# Patient Record
Sex: MALE | Employment: FULL TIME | URBAN - METROPOLITAN AREA
[De-identification: names, ages, dates, MRNs, and addresses within clinical notes are randomized per-mention and may not be internally consistent; named-entity substitution may affect disease eponyms.]

---

## 2018-08-06 ENCOUNTER — DOCUMENTATION ONLY (OUTPATIENT)
Dept: FAMILY MEDICINE | Facility: CLINIC | Age: 43
End: 2018-08-06

## 2018-08-06 ENCOUNTER — OFFICE VISIT (OUTPATIENT)
Dept: FAMILY MEDICINE | Facility: CLINIC | Age: 43
End: 2018-08-06
Payer: COMMERCIAL

## 2018-08-06 VITALS
RESPIRATION RATE: 14 BRPM | HEART RATE: 61 BPM | DIASTOLIC BLOOD PRESSURE: 78 MMHG | OXYGEN SATURATION: 97 % | HEIGHT: 67 IN | WEIGHT: 160 LBS | SYSTOLIC BLOOD PRESSURE: 116 MMHG | BODY MASS INDEX: 25.11 KG/M2 | TEMPERATURE: 97.8 F

## 2018-08-06 DIAGNOSIS — Z02.89 HISTORY AND PHYSICAL EXAMINATION, IMMIGRATION: Primary | ICD-10-CM

## 2018-08-06 PROCEDURE — 36415 COLL VENOUS BLD VENIPUNCTURE: CPT | Performed by: FAMILY MEDICINE

## 2018-08-06 PROCEDURE — 86480 TB TEST CELL IMMUN MEASURE: CPT | Performed by: FAMILY MEDICINE

## 2018-08-06 PROCEDURE — 86706 HEP B SURFACE ANTIBODY: CPT | Performed by: FAMILY MEDICINE

## 2018-08-06 PROCEDURE — 86735 MUMPS ANTIBODY: CPT | Performed by: FAMILY MEDICINE

## 2018-08-06 PROCEDURE — 86592 SYPHILIS TEST NON-TREP QUAL: CPT | Performed by: FAMILY MEDICINE

## 2018-08-06 PROCEDURE — 99385 PREV VISIT NEW AGE 18-39: CPT | Performed by: FAMILY MEDICINE

## 2018-08-06 PROCEDURE — 86762 RUBELLA ANTIBODY: CPT | Performed by: FAMILY MEDICINE

## 2018-08-06 PROCEDURE — 86708 HEPATITIS A ANTIBODY: CPT | Performed by: FAMILY MEDICINE

## 2018-08-06 PROCEDURE — 86787 VARICELLA-ZOSTER ANTIBODY: CPT | Performed by: FAMILY MEDICINE

## 2018-08-06 PROCEDURE — 99000 SPECIMEN HANDLING OFFICE-LAB: CPT | Performed by: FAMILY MEDICINE

## 2018-08-06 PROCEDURE — 86780 TREPONEMA PALLIDUM: CPT | Mod: 59 | Performed by: FAMILY MEDICINE

## 2018-08-06 PROCEDURE — 86780 TREPONEMA PALLIDUM: CPT | Mod: 90 | Performed by: FAMILY MEDICINE

## 2018-08-06 PROCEDURE — 80307 DRUG TEST PRSMV CHEM ANLYZR: CPT | Mod: 90 | Performed by: FAMILY MEDICINE

## 2018-08-06 PROCEDURE — 87591 N.GONORRHOEAE DNA AMP PROB: CPT | Performed by: FAMILY MEDICINE

## 2018-08-06 ASSESSMENT — PAIN SCALES - GENERAL: PAINLEVEL: NO PAIN (0)

## 2018-08-06 NOTE — MR AVS SNAPSHOT
"              After Visit Summary   8/6/2018    Ramy Conti    MRN: [id number]           Patient Information     Date Of Birth          1975        Visit Information        Provider Department      8/6/2018 3:40 PM Raymond Hayes MD [facility]        Today's Diagnoses     History and physical examination, immigration    -  1       Follow-ups after your visit        Follow-up notes from your care team     Return if symptoms worsen or fail to improve.      Your next 10 appointments already scheduled     Aug 17, 2018  2:00 PM CDT   Nurse Only with PH FLOAT MA   [facility] ([facility])    [address] [place] [id number] [phone]            Aug 24, 2018  2:00 PM CDT   Nurse Only with PH FLOAT MA   [facility] ([address] [phone]              Who to contact     If you have questions or need follow up information about today's clinic visit or your schedule please contact [facility] directly at [phone].  Normal or non-critical lab and imaging results will be communicated to you by Ribbonhart, letter or phone within 4 business days after the clinic has received the results. If you do not hear from us within 7 days, please contact the clinic through TAPQUADt or phone. If you have a critical or abnormal lab result, we will notify you by phone as soon as possible.  Submit refill requests through [employer] or call your pharmacy and they will forward the refill request to us. Please allow 3 business days for your refill to be completed.          Additional Information About Your Visit        MyChart Information     [employer] lets you send messages to your doctor, view your test results, renew your prescriptions, schedule appointments and more. To sign up, go to [email or web address] . Click on \"Log in\" on the left side of the screen, which will take you to " "the Welcome page. Then click on \"Sign up Now\" on the right side of the page.     You will be asked to enter the access code listed below, as well as some personal information. Please follow the directions to create your username and password.     Your access code is: [email or web address]  Expires: 2018  9:00 PM     Your access code will  in 90 days. If you need help or a new code, please call your [place] clinic or [phone].        Care EveryWhere ID     This is your Care EveryWhere ID. This could be used by other organizations to access your [place] medical records  [id number]        Your Vitals Were     Pulse Temperature Respirations Height Pulse Oximetry BMI (Body Mass Index)    61 97.8  F (36.6  C) (Temporal) 14 5' 7.4\" (1.712 m) 97% 24.76 kg/m2       Blood Pressure from Last 3 Encounters:   08/10/18 104/68   18 116/78   11 110/70    Weight from Last 3 Encounters:   08/10/18 158 lb 9.6 oz (71.9 kg)   18 160 lb (72.6 kg)   11 160 lb (72.6 kg)              We Performed the Following     C IMMIGRATION PHYSICAL     Hepatitis A Antibody IgG     Hepatitis B Surface Antibody     Mumps Antibody IgG     Neisseria gonorrhoeae PCR     Pain Drug Scr UR W Rptd Meds     Quantiferon TB Gold Plus     Rapid Plasma Reagin w Rflx to TITER     Rubella Antibody IgG Quantitative     Treponema Abs w Reflex to RPR and Titer     Treponema pallidum antibody confirm     Varicella Zoster Virus Antibody IgG        Primary Care Provider Fax #    Physician No Ref-Primary [phone]       No address on file        Equal Access to Services     EUSE[employer] GAMBINO : Konstantin Gibbs, rocío shabazz, melchor burroughs. So [facility] [phone].    ATENCIÓN: Si habla español, tiene a motley disposición servicios gratuitos de asistencia lingüística. Llame al [phone].    We comply with applicable federal civil rights laws and [place] laws. We do not " discriminate on the basis of race, color, national origin, age, disability, sex, sexual orientation, or gender identity.            Thank you!     Thank you for choosing [facility]  for your care. Our goal is always to provide you with excellent care. Hearing back from our patients is one way we can continue to improve our services. Please take a few minutes to complete the written survey that you may receive in the mail after your visit with us. Thank you!             Your Updated Medication List - Protect others around you: Learn how to safely use, store and throw away your medicines at www.disposemymeds.org.      Notice  As of 8/6/2018 11:59 PM    You have not been prescribed any medications.

## 2018-08-06 NOTE — PROGRESS NOTES
Ramy is here for INS physical exam.  He moved from [facility] in 1994. 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  Stated that overall he is doing well and has no concern.  Denies of headache or dizziness.  No URI symptoms and denies of CP or SOB.  No fever or chill. No N/V/D/C.  No history of any of STI and denies any risk for it.  No penile discharge or having rash in the groin area. No of depression and has no history of depression, anxiety or mood disorder. No history or current homicidal or suicidal ideation.  Denies of hallucination and has no history of psychiatric hospitalization.  No pain and has no problem with sleeping.  Eating and drinking ok.  No problem with urination.  Denies of coughing or nigth sweat. No weight loss.  No exposure to TB.  No history of TB.  Has not positive PPD test.  Denies of using any kind of drug.      Problem list and histories reviewed & adjusted, as indicated.  Additional history: as documented      .PAST MEDICAL HISTORY:   Past Medical History:   Diagnosis Date     NO ACTIVE PROBLEMS        PAST SURGICAL HISTORY:   Past Surgical History:   Procedure Laterality Date     NO HISTORY OF SURGERY         FAMILY HISTORY:   Family History   Problem Relation Age of Onset     No Known Problems Mother      No Known Problems Father      Unknown/Adopted Maternal Grandmother      Unknown/Adopted Maternal Grandfather      Unknown/Adopted Paternal Grandmother      Unknown/Adopted Paternal Grandfather      No Known Problems Brother      No Known Problems Sister      No Known Problems Brother        SOCIAL HISTORY:   Social History   Substance Use Topics     Smoking status: Former Smoker     Smokeless tobacco: Former User     Alcohol use No      Comment: quit 6 years ago        No Known Allergies    No current outpatient prescriptions on file.         ROS:  Constitutional, HEENT, cardiovascular, pulmonary, gi and gu systems are negative, except as otherwise  "noted.      OBJECTIVE:                                                      Vitals: /78 (BP Location: Right arm, Patient Position: Sitting, Cuff Size: Adult Regular)  Pulse 61  Temp 97.8  F (36.6  C) (Temporal)  Resp 14  Ht 5' 7.4\" (1.712 m)  Wt 160 lb (72.6 kg)  SpO2 97%  BMI 24.76 kg/m2  BMI= Body mass index is 24.76 kg/(m^2).   GENERAL: healthy, alert and no distress  EYES: Eyes grossly normal to inspection, PERRL and conjunctivae and sclerae normal  HENT: ear canals and TM's normal, nose and mouth without ulcers or lesions.  Nares are non-congested. Oropharynx is pink and moist. No tender with palpation to the sinuses.  NECK: no adenopathy, no asymmetry or masses and thyroid normal to palpation.  RESP: lungs clear to auscultation - no rales, rhonchi or wheezes  CV: regular rate and rhythm, no murmur, no peripheral edema and peripheral pulses strong  ABDOMEN: soft, nontender, no hepatosplenomegaly or masses and bowel sounds normal   (male): normal male genitalia without lesions or urethral discharge, no hernia  MS: no gross musculoskeletal defects noted, no edema. Walk with no limping, normal gait. All 4 extremities are equally in strength. Ankle, knees, hips, shoulders, elbows and wrists exams normal. Normal fine motor skills on fingers. Back is straight, no lordosis or scoliosis. No tender with palpation.  SKIN: no suspicious lesions or rashes  NEURO: Normal strength and tone, mentation intact and speech normal.  No focal deficit.  PSYCH: mentation appears normal, affect normal/bright. No hallucination, suicidal or homicidal ideation.  Appropriate insight.    Diagnostic Test Results:   No results found for this or any previous visit (from the past 24 hour(s)).       ASSESSMENT/PLAN:                                                      1. Health examination of defined subpopulation  I personal reviewed the report of Medical Examination and Vaccination Record from [facility].  He has no chronic medical condition. Has not had positive PPD test and he displayed no symptoms of active TB.  No exposure to TB.  Recheck the QuantiFERON level. Denies any risk for STD, but will check for RPR (syphyllis) and gonorrhea. Exam showed no genital rash. He has no history of depression, anxiety, mood disorder and has never been diagnosed or hospitalized for any psychiatric disorders.  He displays no physical or mental disorders with associated harmful behavior. He denied of using drug - but will send out a urine drug screen. Reviewed his immunization record, lab ordered as below. Will fill out his paper on his behalf once the results are available.   Instructed him to not open the sealed envelope. All of his questions were answered and encourage to call in if has any concern.    Also inform him that he should follow up with his primary provider for his routine and chronic medical care. I did not address any chronic medical problem today.  He understands.      ICD-10-CM    1. History and physical examination, immigration Z02.89 C IMMIGRATION PHYSICAL     Hepatitis A Antibody IgG     Hepatitis B Surface Antibody     Mumps Antibody IgG     Rubella Antibody IgG Quantitative     Varicella Zoster Virus Antibody IgG     Treponema Abs w Reflex to RPR and Titer     Quantiferon TB Gold Plus     Pain Drug Scr UR W Rptd Meds     Neisseria gonorrhoeae PCR     CANCELED: Pain Drug Scr UR W Rptd Meds     CANCELED: M Tuberculosis by Quantiferon     CANCELED: Neisseria gonorrhoeae PCR         F/U as needed.    Raymond Morton Mai, MD  [facility]

## 2018-08-07 LAB
MUV IGG SER QL IA: 0.8 AI (ref 0–0.8)
RPR SER QL: NONREACTIVE
RUBV IGG SERPL IA-ACNC: 26 IU/ML
T PALLIDUM AB SER QL: REACTIVE
VZV IGG SER QL IA: 1.5 AI (ref 0–0.8)

## 2018-08-07 NOTE — PROGRESS NOTES
Patient did not leave a urine sample, orders were put back in future for one week. Thanks, Arely puri

## 2018-08-07 NOTE — PROGRESS NOTES
Informed pt that he needs to get back to get these labs for INS - Gonorrhea and drug screen. thanks

## 2018-08-08 LAB
GAMMA INTERFERON BACKGROUND BLD IA-ACNC: 0.07 IU/ML
HAV IGG SER QL IA: REACTIVE
HBV SURFACE AB SERPL IA-ACNC: 0 M[IU]/ML
M TB IFN-G BLD-IMP: NEGATIVE
M TB IFN-G CD4+ BCKGRND COR BLD-ACNC: >10 IU/ML
MITOGEN IGNF BCKGRD COR BLD-ACNC: 0 IU/ML
MITOGEN IGNF BCKGRD COR BLD-ACNC: 0.01 IU/ML
N GONORRHOEA DNA SPEC QL NAA+PROBE: NEGATIVE
SPECIMEN SOURCE: NORMAL

## 2018-08-09 ENCOUNTER — TELEPHONE (OUTPATIENT)
Dept: FAMILY MEDICINE | Facility: CLINIC | Age: 43
End: 2018-08-09

## 2018-08-09 LAB — T PALLIDUM AB SER QL AGGL: REACTIVE

## 2018-08-09 NOTE — TELEPHONE ENCOUNTER
----- Message from Raymond Morton Mai, MD sent at 8/9/2018  5:19 PM CDT -----  Please have patient to follow up for lab result on Friday or early next week

## 2018-08-10 ENCOUNTER — OFFICE VISIT (OUTPATIENT)
Dept: FAMILY MEDICINE | Facility: CLINIC | Age: 43
End: 2018-08-10
Payer: COMMERCIAL

## 2018-08-10 VITALS
HEART RATE: 80 BPM | SYSTOLIC BLOOD PRESSURE: 104 MMHG | RESPIRATION RATE: 16 BRPM | OXYGEN SATURATION: 98 % | TEMPERATURE: 98.3 F | BODY MASS INDEX: 24.55 KG/M2 | WEIGHT: 158.6 LBS | DIASTOLIC BLOOD PRESSURE: 68 MMHG

## 2018-08-10 DIAGNOSIS — A53.0 SYPHILI, LATENT: Primary | ICD-10-CM

## 2018-08-10 LAB — PAIN DRUG SCR UR W RPTD MEDS: NORMAL

## 2018-08-10 PROCEDURE — 99213 OFFICE O/P EST LOW 20 MIN: CPT | Mod: 25 | Performed by: FAMILY MEDICINE

## 2018-08-10 PROCEDURE — 36415 COLL VENOUS BLD VENIPUNCTURE: CPT | Performed by: FAMILY MEDICINE

## 2018-08-10 PROCEDURE — 96372 THER/PROPH/DIAG INJ SC/IM: CPT | Performed by: FAMILY MEDICINE

## 2018-08-10 PROCEDURE — 87389 HIV-1 AG W/HIV-1&-2 AB AG IA: CPT | Performed by: FAMILY MEDICINE

## 2018-08-10 ASSESSMENT — PAIN SCALES - GENERAL: PAINLEVEL: NO PAIN (0)

## 2018-08-10 NOTE — NURSING NOTE
Prior to injection verified patient identity using patient's name and date of birth.   Patient instructed to remain in clinic for 20 minutes afterwards, and to report any adverse reaction to me immediately.  Sandie Justice MA

## 2018-08-10 NOTE — MR AVS SNAPSHOT
"              After Visit Summary   8/10/2018    Ramy Conti    MRN: [id number]           Patient Information     Date Of Birth          1975        Visit Information        Provider Department      8/10/2018 3:20 PM Raymond Hayes MD [facility]        Today's Diagnoses     Syphili, latent    -  1       Follow-ups after your visit        Your next 10 appointments already scheduled     Aug 17, 2018  2:00 PM CDT   Nurse Only with PH FLOAT MA   [facility]    [address] [place] [id number] [phone]            Aug 24, 2018  2:00 PM CDT   Nurse Only with PH FLOAT MA   [facility]    [address] [place] [id number] [phone]              Who to contact     If you have questions or need follow up information about today's clinic visit or your schedule please contact [facility] directly at [phone].  Normal or non-critical lab and imaging results will be communicated to you by [employer] Fund[employer], letter or phone within 4 business days after the clinic has received the results. If you do not hear from us within 7 days, please contact the clinic through Viva [employer] Vit[employer] or phone. If you have a critical or abnormal lab result, we will notify you by phone as soon as possible.  Submit refill requests through [employer] or call your pharmacy and they will forward the refill request to us. Please allow 3 business days for your refill to be completed.          Additional Information About Your Visit        [employer]harOmicia Information     [employer] lets you send messages to your doctor, view your test results, renew your prescriptions, schedule appointments and more. To sign up, go to [email or web address] . Click on \"Log in\" on the left side of the screen, which will take you to the Welcome page. Then click on \"Sign up Now\" on the right side of the page.     You will be asked to enter the access " code listed below, as well as some personal information. Please follow the directions to create your username and password.     Your access code is: [email or web address]  Expires: 2018  9:00 PM     Your access code will  in 90 days. If you need help or a new code, please call your [facility] or [phone].        Care EveryWhere ID     This is your Care EveryWhere ID. This could be used by other organizations to access your [place] medical records  [id number]        Your Vitals Were     Pulse Temperature Respirations Pulse Oximetry BMI (Body Mass Index)       80 98.3  F (36.8  C) (Temporal) 16 98% 24.55 kg/m2        Blood Pressure from Last 3 Encounters:   08/10/18 104/68   18 116/78   11 110/70    Weight from Last 3 Encounters:   08/10/18 158 lb 9.6 oz (71.9 kg)   18 160 lb (72.6 kg)   11 160 lb (72.6 kg)              We Performed the Following     C INJECTION, PENICILLIN G BENZATHINE ,000 UNITS     HIV Antigen Antibody Combo     INJECTION INTRAMUSCULAR OR SUB-Q     INJECTION INTRAMUSCULAR OR SUB-Q          Today's Medication Changes          These changes are accurate as of 8/10/18  5:35 PM.  If you have any questions, ask your nurse or doctor.               Start taking these medicines.        Dose/Directions    penicillin G benzathine [id number] UNIT/2ML injection   Commonly known as:  BICILLIN L-A   Used for:  Syphili, latent   Started by:  Raymond Hayes MD        Inject 2 mls in each gluteus once weekly for 3 weeks 8/10/2018 2018 2018   Quantity:  4 mL   Refills:  2            Where to get your medicines      Some of these will need a paper prescription and others can be bought over the counter.  Ask your nurse if you have questions.     Bring a paper prescription for each of these medications     penicillin G benzathine [id number] UNIT/2ML injection                Primary Care Provider Fax #    Physician No Ref-Primary [phone]       No address on file         Equal Access to Services     [facility] HospitalELIO : Hadii aad ku hadelbaramsey Suereji, wafraciscoda luqadaha, qaybta dinorahavismelchor sanchez. So [facility] [phone].    ATENCIÓN: Si habla español, tiene a motley disposición servicios gratuitos de asistencia lingüística. Llame al [phone].    We comply with applicable federal civil rights laws and [place] laws. We do not discriminate on the basis of race, color, national origin, age, disability, sex, sexual orientation, or gender identity.            Thank you!     Thank you for choosing [facility]  for your care. Our goal is always to provide you with excellent care. Hearing back from our patients is one way we can continue to improve our services. Please take a few minutes to complete the written survey that you may receive in the mail after your visit with us. Thank you!             Your Updated Medication List - Protect others around you: Learn how to safely use, store and throw away your medicines at www.disposemymeds.org.          This list is accurate as of 8/10/18  5:35 PM.  Always use your most recent med list.                   Brand Name Dispense Instructions for use Diagnosis    penicillin G benzathine [id number] UNIT/2ML injection    BICILLIN L-A    4 mL    Inject 2 mls in each gluteus once weekly for 3 weeks 8/10/2018 8/17/2018 8/24/2018    Syphili, latent

## 2018-08-10 NOTE — PROGRESS NOTES
SUBJECTIVE:   Ramy Conti is a 42 year old male who presents to clinic today for the following health issues:    Results    Ramy is here today to follow-up on the recent lab results. He was seen last week for INS physical and lab work showed positive for syphilis infection. Stated that this is the first time he ever been tested.  Never been treated for any STD, include Syphilis.  Had six sexual partners in his life - knew then all well.  Been with is wife for 16 years and has been in a monogamous relationship.  His wife was tested last week was negative.  Denied of same gender sexual relationship. Denied of groin rash or lesion. No problem with memory. Denied of headache or dizziness. No chest pain or shortness of breath. No acute change in his vision.  No history of IV drugs use. No fever or chills. No other concerns today.    Problem list and histories reviewed & adjusted, as indicated.  Additional history: as documented      ROS:  Constitutional, HEENT, cardiovascular, pulmonary, gi and gu systems are negative, except as otherwise noted.    OBJECTIVE:                                                    /68 (BP Location: Right arm, Patient Position: Sitting, Cuff Size: Adult Regular)  Pulse 80  Temp 98.3  F (36.8  C) (Temporal)  Resp 16  Wt 158 lb 9.6 oz (71.9 kg)  SpO2 98%  BMI 24.55 kg/m2    GENERAL: healthy, alert and no distress  NECK: no adenopathy.  RESP: lungs clear to auscultation - no rales, rhonchi or wheezes  CV: regular rate and rhythm, no murmur.  ABDOMEN: soft, nontender, no hepatosplenomegaly and bowel sounds normal   (male): normal male genitalia without lesions or urethral discharge, no hernia.  Testes are descended bilaterally. No tender with palpation to the last deference. No testicular mass.  SKIN: no suspicious lesions or rashes.  No ulceration  NEURO: Normal strength and tone.  Cranial nerve II-12 are intact. No focal neurological deficit. DTR +2/2  throughout.      Diagnostic Test Results:  Results for orders placed or performed in visit on 08/06/18   Hepatitis A Antibody IgG   Result Value Ref Range    Hepatitis A Antibody IgG Reactive (AA) NR^Nonreactive   Hepatitis B Surface Antibody   Result Value Ref Range    Hepatitis B Surface Antibody 0.00 <8.00 m[IU]/mL   Mumps Antibody IgG   Result Value Ref Range    Mumps Antibody IgG 0.8 0.0 - 0.8 AI   Rubella Antibody IgG Quantitative   Result Value Ref Range    Rubella Antibody IgG Quantitative 26 IU/mL   Varicella Zoster Virus Antibody IgG   Result Value Ref Range    Varicella Zoster Virus Antibody IgG 1.5 (H) 0.0 - 0.8 AI   Treponema Abs w Reflex to RPR and Titer   Result Value Ref Range    Treponema Antibodies Reactive (A) NR^Nonreactive   Pain Drug Scr UR W Rptd Meds   Result Value Ref Range    Pain Drug SCR UR W RPTD Meds FINAL    Rapid Plasma Reagin w Rflx to TITER   Result Value Ref Range    Rapid Plasma Reagin Nonreactive NR^Nonreactive   Treponema pallidum antibody confirm   Result Value Ref Range    T Pallidum by TP-PA conf Reactive (A) Non Reactive   Quantiferon TB Gold Plus   Result Value Ref Range    Quantiferon-TB Gold Plus Result Negative NEG^Negative    TB1 Ag minus Nil Value 0.01 IU/mL    TB2 Ag minus Nil Value 0.00 IU/mL    Mitogen minus Nil Result >10.00 IU/mL    Nil Result 0.07 IU/mL   Neisseria gonorrhoeae PCR   Result Value Ref Range    Specimen Descrip Unspecified Urine     N Gonorrhea PCR Negative NEG^Negative            ASSESSMENT/PLAN:                                                        ICD-10-CM    1. Syphili, latent A53.0 HIV Antigen Antibody Combo     INJECTION INTRAMUSCULAR OR SUB-Q     C INJECTION, PENICILLIN G BENZATHINE ,000 UNITS     INJECTION INTRAMUSCULAR OR SUB-Q     penicillin G benzathine (BICILLIN L-A) [id number] UNIT/2ML injection        I consulted with ID over the phone in regarding to the lab result. He was diagnosed with latent syphilis and was recommended to  treat with penicillin.  Discussed with patient about the significance of the finding. Informed him that he has syphilis; unclear how long he has been having it.  Never been treated.  Treat with Penicillin G 2.4 million units weekly for 3 doses total - first dose given today.  He also needs to inform all of his partners so that they can get checked and treated if indicated.  Educated him on safe sex; encouraged abstinence condom consistently until he is fully treated.  Rechecked in 6 and 12 months. Will check for HIV today.  Side effect from the medication discussed.      Raymond Morton Mai, MD  [facility]

## 2018-08-11 LAB — HIV 1+2 AB+HIV1 P24 AG SERPL QL IA: NONREACTIVE

## 2018-08-13 ENCOUNTER — TELEPHONE (OUTPATIENT)
Dept: FAMILY MEDICINE | Facility: CLINIC | Age: 43
End: 2018-08-13

## 2018-08-13 NOTE — TELEPHONE ENCOUNTER
----- Message from Raymond Morton Mai, MD sent at 8/10/2018 10:44 PM CDT -----  Please let patient is not immunized to hepatitis B.  The first dose hepatitis B vaccination for INS paper.  This can be given at his next appointment for the penicillin injection.

## 2018-08-14 NOTE — TELEPHONE ENCOUNTER
Called patient and left a voicemail, when patient calls back please see message below.     Peggy Yanez, CMA

## 2018-08-14 NOTE — TELEPHONE ENCOUNTER
Patient returned call, results message per Dr Hayes were relayed to patient.  No further questions  Thank you,  Мария Lance  Patient Representative

## 2018-08-14 NOTE — TELEPHONE ENCOUNTER
----- Message from Raymond Morton Mai, MD sent at 8/13/2018  6:08 PM CDT -----  Please let patient know that his screening test for HIV was negative.

## 2018-08-17 ENCOUNTER — ALLIED HEALTH/NURSE VISIT (OUTPATIENT)
Dept: FAMILY MEDICINE | Facility: CLINIC | Age: 43
End: 2018-08-17
Payer: COMMERCIAL

## 2018-08-17 DIAGNOSIS — Z23 NEED FOR VACCINATION: Primary | ICD-10-CM

## 2018-08-17 PROCEDURE — 90746 HEPB VACCINE 3 DOSE ADULT IM: CPT

## 2018-08-17 PROCEDURE — 90471 IMMUNIZATION ADMIN: CPT

## 2018-08-17 PROCEDURE — 99207 ZZC NO CHARGE LOS: CPT

## 2018-08-17 NOTE — MR AVS SNAPSHOT
"              After Visit Summary   8/17/2018    Ramy Conti    MRN: [id number]           Patient Information     Date Of Birth          1975        Visit Information        Provider Department      8/17/2018 2:00 PM IZZY WATSON [facility]        Today's Diagnoses     Need for vaccination    -  1       Follow-ups after your visit        Your next 10 appointments already scheduled     Aug 24, 2018  2:00 PM CDT   Nurse Only with IZZY WTASON [facility] ([facility])    [address] [place] [address] [phone] [phone]              Who to contact     If you have questions or need follow up information about today's clinic visit or your schedule please contact [facility] directly at [phone].  Normal or non-critical lab and imaging results will be communicated to you by Wobeekhart, letter or phone within 4 business days after the clinic has received the results. If you do not hear from us within 7 days, please contact the clinic through Wobeekhart or phone. If you have a critical or abnormal lab result, we will notify you by phone as soon as possible.  Submit refill requests through ClearApp or call your pharmacy and they will forward the refill request to us. Please allow 3 business days for your refill to be completed.          Additional Information About Your Visit        MyCharAndroid App Review Source Information     ClearApp lets you send messages to your doctor, view your test results, renew your prescriptions, schedule appointments and more. To sign up, go to [email or web address] . Click on \"Log in\" on the left side of the screen, which will take you to the Welcome page. Then click on \"Sign up Now\" on the right side of the page.     You will be asked to enter the access code listed below, as well as some personal information. Please follow the directions to create your username and password.     Your access code is: [email or web address]  Expires: 11/4/2018  9:00 " PM     Your access code will  in 90 days. If you need help or a new code, please call your [place] clinic or [phone].        Care EveryWhere ID     This is your Care EveryWhere ID. This could be used by other organizations to access your [place] medical records  [id number]         Blood Pressure from Last 3 Encounters:   08/10/18 104/68   18 116/78   11 110/70    Weight from Last 3 Encounters:   08/10/18 158 lb 9.6 oz (71.9 kg)   18 160 lb (72.6 kg)   11 160 lb (72.6 kg)              We Performed the Following     1st  Administration  [63203]     HEPATITIS B VACCINE,  ADULT  [48201]        Primary Care Provider Fax #    Physician No Ref-Primary [phone]       No address on file        Equal Access to Services     EUSEBIO GAMBINO : Hadii bridgett lind Soreji, waaxda luqadaha, qaybta kaalmada denis, melchor peña . So [facility] [phone].    ATENCIÓN: Si habla español, tiene a motley disposición servicios gratuitos de asistencia lingüística. Llame al [phone].    We comply with applicable federal civil rights laws and [place] laws. We do not discriminate on the basis of race, color, national origin, age, disability, sex, sexual orientation, or gender identity.            Thank you!     Thank you for choosing [facility]  for your care. Our goal is always to provide you with excellent care. Hearing back from our patients is one way we can continue to improve our services. Please take a few minutes to complete the written survey that you may receive in the mail after your visit with us. Thank you!             Your Updated Medication List - Protect others around you: Learn how to safely use, store and throw away your medicines at www.disposemymeds.org.          This list is accurate as of 18 11:59 PM.  Always use your most recent med list.                   Brand Name Dispense Instructions for use Diagnosis    penicillin G  benzathine [id number] UNIT/2ML injection    BICILLIN L-A    4 mL    Inject 2 mls in each gluteus once weekly for 3 weeks 8/10/2018 8/17/2018 8/24/2018    Syphili, latent

## 2018-08-23 NOTE — PROGRESS NOTES
Prior to injection verified patient identity using patient's name and date of birth.  Due to injection administration, patient instructed to remain in clinic for 15 minutes  afterwards, and to report any adverse reaction to me immediately.    Screening Questionnaire for Adult Immunization    Are you sick today?   No   Do you have allergies to medications, food, a vaccine component or latex?   No   Have you ever had a serious reaction after receiving a vaccination?   No   Do you have a long-term health problem with heart disease, lung disease, asthma, kidney disease, metabolic disease (e.g. diabetes), anemia, or other blood disorder?   No   Do you have cancer, leukemia, HIV/AIDS, or any other immune system problem?   No   In the past 3 months, have you taken medications that affect  your immune system, such as prednisone, other steroids, or anticancer drugs; drugs for the treatment of rheumatoid arthritis, Crohn s disease, or psoriasis; or have you had radiation treatments?   No   Have you had a seizure, or a brain or other nervous system problem?   No   During the past year, have you received a transfusion of blood or blood     products, or been given immune (gamma) globulin or antiviral drug?   No   For women: Are you pregnant or is there a chance you could become        pregnant during the next month?   No   Have you received any vaccinations in the past 4 weeks?   No     Immunization questionnaire answers were all negative.        Per orders of Dr. Hayes , injection of Hep B given by Selene Paniagua. Patient instructed to remain in clinic for 15 minutes afterwards, and to report any adverse reaction to me immediately.       Screening performed by Selene Paniagua on 8/23/2018 at 6:49 PM. & Peggy Yanez MA

## 2018-08-24 ENCOUNTER — ALLIED HEALTH/NURSE VISIT (OUTPATIENT)
Dept: FAMILY MEDICINE | Facility: CLINIC | Age: 43
End: 2018-08-24
Payer: COMMERCIAL

## 2018-08-24 DIAGNOSIS — A53.0 SYPHILI, LATENT: Primary | ICD-10-CM

## 2018-08-24 PROCEDURE — 96372 THER/PROPH/DIAG INJ SC/IM: CPT

## 2018-08-24 NOTE — PROGRESS NOTES
The following medication was given:     MEDICATION: Bicillin CR 1.2  ROUTE: IM  SITE: [facility]  DOSE: 2 mL  LOT #: [id number]  :  [employer]  EXPIRATION DATE:  12/19  NDC#: 32110-490-30  Rosemarie Zhou CMA

## 2018-08-24 NOTE — PROGRESS NOTES
Screening Questionnaire for Adult Immunization    Are you sick today?   No   Do you have allergies to medications, food, a vaccine component or latex?   No   Have you ever had a serious reaction after receiving a vaccination?   No   Do you have a long-term health problem with heart disease, lung disease, asthma, kidney disease, metabolic disease (e.g. diabetes), anemia, or other blood disorder?   No   Do you have cancer, leukemia, HIV/AIDS, or any other immune system problem?   No   In the past 3 months, have you taken medications that affect  your immune system, such as prednisone, other steroids, or anticancer drugs; drugs for the treatment of rheumatoid arthritis, Crohn s disease, or psoriasis; or have you had radiation treatments?   No   Have you had a seizure, or a brain or other nervous system problem?   No   During the past year, have you received a transfusion of blood or blood     products, or been given immune (gamma) globulin or antiviral drug?   No   For women: Are you pregnant or is there a chance you could become        pregnant during the next month?   No   Have you received any vaccinations in the past 4 weeks?   No     Immunization questionnaire answers were all negative. Prior to injection verified patient identity using patient's name and date of birth.  Due to injection administration, patient instructed to remain in clinic for 15 minutes  afterwards, and to report any adverse reaction to me immediately.           Per orders of Dr. Hayes, injection of  Penicillin [id number] unit 2mL given by Rosemarie Zhou. Patient instructed to remain in clinic for 15 minutes afterwards, and to report any adverse reaction to me immediately.       Screening performed by Rosemarie Zhou on 8/24/2018 at 1:50 PM.

## 2018-08-24 NOTE — MR AVS SNAPSHOT
"              After Visit Summary   8/24/2018    Ramy Conti    MRN: [id number]           Patient Information     Date Of Birth          1975        Visit Information        Provider Department      8/24/2018 2:00 PM IZZY WATSON [facility]        Today's Diagnoses     Syphili, latent    -  1       Follow-ups after your visit        Your next 10 appointments already scheduled     Aug 24, 2018  2:00 PM CDT   [facility]    [address] [place] [address] [phone] [phone]              Who to contact     If you have questions or need follow up information about today's clinic visit or your schedule please contact [facility] directly at [phone].  Normal or non-critical lab and imaging results will be communicated to you by Skill-Lifehart, letter or phone within 4 business days after the clinic has received the results. If you do not hear from us within 7 days, please contact the clinic through MyChart or phone. If you have a critical or abnormal lab result, we will notify you by phone as soon as possible.  Submit refill requests through Zwamy or call your pharmacy and they will forward the refill request to us. Please allow 3 business days for your refill to be completed.          Additional Information About Your Visit        MyChart Information     Zwamy lets you send messages to your doctor, view your test results, renew your prescriptions, schedule appointments and more. To sign up, go to [email or web address] . Click on \"Log in\" on the left side of the screen, which will take you to the Welcome page. Then click on \"Sign up Now\" on the right side of the page.     You will be asked to enter the access code listed below, as well as some personal information. Please follow the directions to create your username and password.     Your access code is: [email or web address]  Expires: 11/4/2018  9:00 PM   "   Your access code will  in 90 days. If you need help or a new code, please call your [place] clinic or [phone].        Care EveryWhere ID     This is your Care EveryWhere ID. This could be used by other organizations to access your [place] medical records  [id number]         Blood Pressure from Last 3 Encounters:   08/10/18 104/68   18 116/78   11 110/70    Weight from Last 3 Encounters:   08/10/18 158 lb 9.6 oz (71.9 kg)   18 160 lb (72.6 kg)   11 160 lb (72.6 kg)              We Performed the Following     C INJECTION, PENICILLIN G BENZATHINE ,000 UNITS     INJECTION INTRAMUSCULAR OR SUB-Q        Primary Care Provider Fax #    Physician No Ref-Primary [phone]       No address on file        Equal Access to Services     EUSEBIO GAMBINO : Hadii bridgett Gibbs, waaxannabel shabazz, jenniferybta kaaldesiree barrios, melchor peña . So [facility] [phone].    ATENCIÓN: Si habla español, tiene a motley disposición servicios gratuitos de asistencia lingüística. Rayoame al [phone].    We comply with applicable federal civil rights laws and [place] laws. We do not discriminate on the basis of race, color, national origin, age, disability, sex, sexual orientation, or gender identity.            Thank you!     Thank you for choosing [facility]  for your care. Our goal is always to provide you with excellent care. Hearing back from our patients is one way we can continue to improve our services. Please take a few minutes to complete the written survey that you may receive in the mail after your visit with us. Thank you!             Your Updated Medication List - Protect others around you: Learn how to safely use, store and throw away your medicines at www.disposemymeds.org.          This list is accurate as of 18  1:54 PM.  Always use your most recent med list.                   Brand Name Dispense Instructions for use Diagnosis     penicillin G benzathine [id number] UNIT/2ML injection    BICILLIN L-A    4 mL    Inject 2 mls in each gluteus once weekly for 3 weeks 8/10/2018 8/17/2018 8/24/2018    Syphili, latent

## 2018-09-04 ENCOUNTER — TELEPHONE (OUTPATIENT)
Dept: FAMILY MEDICINE | Facility: CLINIC | Age: 43
End: 2018-09-04

## 2018-09-04 NOTE — TELEPHONE ENCOUNTER
I have completed my section(s) of the immigration paperwork.      Mackenzie or Emmy, please complete the paperwork process and notify the patient once completed.      Thank you,    Raymond Hayes MD  [facility]

## 2018-09-05 NOTE — TELEPHONE ENCOUNTER
Tried to reach patient, left message for patient to call the clinic back.  Patient's Immigration Forms are ready for .  Encourage patient to pick the forms up versus mailing them. Once they go in the [place] Mail we have no control over the original paperwork or how long it takes to get them. We can not send any other way besides [place] Mail. If they still choose to do [place] Mail, make sure you verify the mailing address:     [address] [place] [phone]    Mackenzie Norris MA 9/5/2018